# Patient Record
Sex: FEMALE | Race: WHITE | NOT HISPANIC OR LATINO | Employment: OTHER | URBAN - METROPOLITAN AREA
[De-identification: names, ages, dates, MRNs, and addresses within clinical notes are randomized per-mention and may not be internally consistent; named-entity substitution may affect disease eponyms.]

---

## 2023-05-16 ENCOUNTER — OFFICE VISIT (OUTPATIENT)
Dept: FAMILY MEDICINE CLINIC | Facility: CLINIC | Age: 43
End: 2023-05-16
Payer: MEDICARE

## 2023-05-16 VITALS
OXYGEN SATURATION: 98 % | DIASTOLIC BLOOD PRESSURE: 82 MMHG | HEART RATE: 105 BPM | SYSTOLIC BLOOD PRESSURE: 126 MMHG | RESPIRATION RATE: 18 BRPM | HEIGHT: 63 IN | TEMPERATURE: 98.7 F | WEIGHT: 217 LBS | BODY MASS INDEX: 38.45 KG/M2

## 2023-05-16 DIAGNOSIS — K21.9 GASTROESOPHAGEAL REFLUX DISEASE WITHOUT ESOPHAGITIS: ICD-10-CM

## 2023-05-16 DIAGNOSIS — R53.82 CHRONIC FATIGUE, UNSPECIFIED: ICD-10-CM

## 2023-05-16 DIAGNOSIS — Z13.220 ENCOUNTER FOR LIPID SCREENING FOR CARDIOVASCULAR DISEASE: ICD-10-CM

## 2023-05-16 DIAGNOSIS — E55.9 VITAMIN D DEFICIENCY: ICD-10-CM

## 2023-05-16 DIAGNOSIS — E53.8 VITAMIN B12 DEFICIENCY: ICD-10-CM

## 2023-05-16 DIAGNOSIS — E66.9 CLASS 2 OBESITY WITHOUT SERIOUS COMORBIDITY WITH BODY MASS INDEX (BMI) OF 38.0 TO 38.9 IN ADULT, UNSPECIFIED OBESITY TYPE: ICD-10-CM

## 2023-05-16 DIAGNOSIS — Z13.6 ENCOUNTER FOR LIPID SCREENING FOR CARDIOVASCULAR DISEASE: ICD-10-CM

## 2023-05-16 DIAGNOSIS — Z00.00 WELLNESS EXAMINATION: Primary | ICD-10-CM

## 2023-05-16 PROBLEM — G43.709 CHRONIC MIGRAINE WITHOUT AURA WITHOUT STATUS MIGRAINOSUS, NOT INTRACTABLE: Status: ACTIVE | Noted: 2022-12-05

## 2023-05-16 PROBLEM — G93.2 PSEUDOTUMOR CEREBRI SYNDROME: Status: ACTIVE | Noted: 2023-05-16

## 2023-05-16 RX ORDER — TOPIRAMATE 200 MG/1
200 CAPSULE, EXTENDED RELEASE ORAL DAILY
COMMUNITY
Start: 2023-03-17

## 2023-05-16 RX ORDER — ACETAZOLAMIDE 250 MG/1
TABLET ORAL
COMMUNITY

## 2023-05-16 RX ORDER — LANOLIN ALCOHOL/MO/W.PET/CERES
CREAM (GRAM) TOPICAL
COMMUNITY
Start: 2023-04-26

## 2023-05-16 RX ORDER — PANTOPRAZOLE SODIUM 40 MG/1
40 TABLET, DELAYED RELEASE ORAL DAILY
COMMUNITY
End: 2023-05-16 | Stop reason: SDUPTHER

## 2023-05-16 RX ORDER — PANTOPRAZOLE SODIUM 40 MG/1
40 TABLET, DELAYED RELEASE ORAL DAILY
Qty: 90 TABLET | Refills: 0 | Status: SHIPPED | OUTPATIENT
Start: 2023-05-16

## 2023-05-16 RX ORDER — BUTALBITAL, ACETAMINOPHEN AND CAFFEINE 300; 40; 50 MG/1; MG/1; MG/1
CAPSULE ORAL
COMMUNITY
Start: 2022-10-27

## 2023-05-16 RX ORDER — ATORVASTATIN CALCIUM 40 MG/1
TABLET, FILM COATED ORAL
COMMUNITY
Start: 2022-10-27

## 2023-05-16 NOTE — PATIENT INSTRUCTIONS
Continue care with neurologist   Pantoprazole sent to pharmacy  Labs ordered - return to clinic 8-4:30 M-F (not 1P-2P during lunch). Will call with results.

## 2023-05-16 NOTE — PROGRESS NOTES
Chief Complaint   Patient presents with   • Establish Care     Was recommended  needs new pcp.    Medication review.       Subjective      Yu Potts is a 42 y.o. who presents to establish care and medication review. Lives with her daughter in [place], [place].     Migraines - States that she follows up with neurology for pseudotumor cerebri  / migraines.  Has a shunt that was placed in 2021 that drains into abdomen.  Follows up every 6 weeks. Permanently disabled for migraines.  Has botox injections regularly and takes medications as prescribed.     GERD - never diagnosed with GERD, but takes protonix daily.  Has tried to stop medication for a few days in the past and her symptoms usually recur (indigestion, heartburn).  Randomly has difficulty with certain foods like pizza.  Protonix seems to be working well.       Chronic fatigue - states no increase, but she is always tired.  She does not sleep well. Has trouble falling asleep.  Has tried medication like tylenol PM, but she is more tired the following day.  Snores sometimes, but no choking or gasping for air in the middle of the night.  Vitamin b-12 deficiency - takes oral vitamin b-12.  Has been on injections in the past before. Has never helped with fatigue.  Takes daily vitamin D as well and that does not help with fatigue.     Exercise - walks around the pool at apartment and parking lots.  Will be swimming this summer. Cannot over exert due to shunt into abdominal cavity - no strenuous exercise.     The following portions of the patient's history were reviewed and updated as appropriate: allergies, current medications, past family history, past medical history, past social history, past surgical history and problem list.    Review of Systems   Constitutional: Positive for fatigue (chronic).   Eyes:        No peripheral vision due to shunt, sees ophthalmologist annually. No change since shunt   Respiratory: Negative for chest tightness and shortness of  "breath.    Cardiovascular: Negative for chest pain and palpitations.   Gastrointestinal: Positive for nausea (only with migraiens) and GERD. Negative for blood in stool, constipation, diarrhea and vomiting.   Genitourinary: Negative for dysuria, menstrual problem (hysterectomy), pelvic pain, urgency, vaginal bleeding and vaginal discharge.   Neurological: Positive for dizziness (intermittently, has good and bad days with shunt), weakness (right side), numbness (right side r/t shunt) and headache. Negative for syncope and speech difficulty.   Psychiatric/Behavioral: Positive for sleep disturbance (2-3 hours at night). Negative for self-injury, suicidal ideas and depressed mood. The patient is nervous/anxious.        Objective   Vital Signs:  /82   Pulse 105   Temp 98.7 °F (37.1 °C)   Resp 18   Ht 160 cm (63\")   Wt 98.4 kg (217 lb)   SpO2 98%   BMI 38.44 kg/m²             Physical Exam  Vitals and nursing note reviewed.   Constitutional:       Appearance: Normal appearance.   HENT:      Right Ear: Tympanic membrane, ear canal and external ear normal.      Left Ear: Tympanic membrane, ear canal and external ear normal.      Nose: Nose normal.      Mouth/Throat:      Mouth: Mucous membranes are moist.      Pharynx: Oropharynx is clear.   Eyes:      Extraocular Movements: Extraocular movements intact.      Pupils: Pupils are equal, round, and reactive to light.   Cardiovascular:      Rate and Rhythm: Regular rhythm.      Pulses: Normal pulses.      Heart sounds: Normal heart sounds.   Pulmonary:      Breath sounds: Normal breath sounds.   Abdominal:      General: Bowel sounds are normal.      Palpations: Abdomen is soft.   Musculoskeletal:         General: Normal range of motion.      Right lower leg: No edema.      Left lower leg: No edema.   Skin:     General: Skin is warm and dry.   Neurological:      General: No focal deficit present.      Mental Status: She is alert.      Cranial Nerves: No cranial " nerve deficit or facial asymmetry.      Motor: No weakness.      Coordination: Coordination normal.      Gait: Gait normal.      Deep Tendon Reflexes:      Reflex Scores:       Patellar reflexes are 2+ on the right side and 2+ on the left side.         Result Review                     Assessment and Plan  Diagnoses and all orders for this visit:    1. Wellness examination (Primary)  -     Hepatitis C Antibody; Future  -     Lipid Panel; Future    2. Gastroesophageal reflux disease without esophagitis  -     pantoprazole (PROTONIX) 40 MG EC tablet; Take 1 tablet by mouth Daily.  Dispense: 90 tablet; Refill: 0    3. Vitamin B12 deficiency  -     Vitamin B12; Future    4. Vitamin D deficiency  -     Vitamin D,25-Hydroxy; Future    5. Class 2 obesity without serious comorbidity with body mass index (BMI) of 38.0 to 38.9 in adult, unspecified obesity type  -     TSH Rfx On Abnormal To Free T4; Future  -     Comprehensive Metabolic Panel; Future  -     CBC & Differential; Future    6. Encounter for lipid screening for cardiovascular disease  -     Lipid Panel; Future    7. Chronic fatigue, unspecified  -     TSH Rfx On Abnormal To Free T4; Future      Discussed possibility that interruption in sleep is contributing to fatigue.  Labs to r/o any other contributing factors.  Will address follow up with lab results            Patient Instructions   1. Continue care with neurologist   2. Pantoprazole sent to pharmacy  3. Labs ordered - return to clinic 8-4:30 M-F (not 1P-2P during lunch). Will call with results.       Discussed medications prescribed and OTC medications recommended.  Discussed medication safety, possible side effects and how to take or administer medications. Instructed patient to report any adverse reactions, side effects or concerns.     Reviewed physical exam findings and plan with patient who verbalized understanding and agrees with plan of care. Patient was given opportunity to ask questions and all  concerns were addressed prior to the conclusion of today's visit.     Follow Up  No follow-ups on file.  Patient was given instructions and counseling regarding her condition or for health maintenance advice. Please see specific information pulled into the AVS if appropriate.

## 2023-05-24 ENCOUNTER — PATIENT ROUNDING (BHMG ONLY) (OUTPATIENT)
Dept: FAMILY MEDICINE CLINIC | Facility: CLINIC | Age: 43
End: 2023-05-24
Payer: MEDICARE

## 2023-05-24 NOTE — PROGRESS NOTES
A My-Chart message has been sent to the patient for PATIENT ROUNDING with [facility].

## 2023-08-11 DIAGNOSIS — K21.9 GASTROESOPHAGEAL REFLUX DISEASE WITHOUT ESOPHAGITIS: ICD-10-CM

## 2023-08-11 RX ORDER — PANTOPRAZOLE SODIUM 40 MG/1
40 TABLET, DELAYED RELEASE ORAL DAILY
Qty: 90 TABLET | Refills: 0 | Status: SHIPPED | OUTPATIENT
Start: 2023-08-11

## 2023-09-25 DIAGNOSIS — K21.9 GASTROESOPHAGEAL REFLUX DISEASE WITHOUT ESOPHAGITIS: ICD-10-CM

## 2023-09-25 RX ORDER — LANOLIN ALCOHOL/MO/W.PET/CERES
1000 CREAM (GRAM) TOPICAL DAILY
Qty: 30 TABLET | Refills: 2 | Status: SHIPPED | OUTPATIENT
Start: 2023-09-25

## 2023-09-25 RX ORDER — ATORVASTATIN CALCIUM 40 MG/1
40 TABLET, FILM COATED ORAL NIGHTLY
Qty: 90 TABLET | Refills: 0 | Status: SHIPPED | OUTPATIENT
Start: 2023-09-25

## 2023-09-25 RX ORDER — PANTOPRAZOLE SODIUM 40 MG/1
40 TABLET, DELAYED RELEASE ORAL DAILY
Qty: 90 TABLET | Refills: 0 | Status: SHIPPED | OUTPATIENT
Start: 2023-09-25

## 2023-09-25 RX ORDER — TOPIRAMATE 200 MG/1
200 CAPSULE, EXTENDED RELEASE ORAL DAILY
Qty: 30 EACH | OUTPATIENT
Start: 2023-09-25

## 2023-09-25 NOTE — TELEPHONE ENCOUNTER
Patient recently switched to [facility] and Jessica has yet to fill this, wanted to see if we can just re call everything into [facility]? Also patient is neeting ViT D 5,000 IU called in she said she takes this daily

## 2023-10-18 DIAGNOSIS — E53.8 VITAMIN B12 DEFICIENCY: ICD-10-CM

## 2023-10-18 DIAGNOSIS — E55.9 VITAMIN D DEFICIENCY: Primary | ICD-10-CM

## 2023-10-18 RX ORDER — LANOLIN ALCOHOL/MO/W.PET/CERES
1000 CREAM (GRAM) TOPICAL DAILY
Qty: 90 TABLET | Refills: 1 | Status: SHIPPED | OUTPATIENT
Start: 2023-10-18

## 2023-11-24 DIAGNOSIS — K21.9 GASTROESOPHAGEAL REFLUX DISEASE WITHOUT ESOPHAGITIS: ICD-10-CM

## 2023-11-27 RX ORDER — PANTOPRAZOLE SODIUM 40 MG/1
40 TABLET, DELAYED RELEASE ORAL DAILY
Qty: 90 TABLET | Refills: 0 | Status: SHIPPED | OUTPATIENT
Start: 2023-11-27

## 2023-11-30 DIAGNOSIS — K21.9 GASTROESOPHAGEAL REFLUX DISEASE WITHOUT ESOPHAGITIS: ICD-10-CM

## 2023-11-30 RX ORDER — ATORVASTATIN CALCIUM 40 MG/1
40 TABLET, FILM COATED ORAL NIGHTLY
Qty: 90 TABLET | Refills: 0 | Status: SHIPPED | OUTPATIENT
Start: 2023-11-30

## 2023-11-30 RX ORDER — PANTOPRAZOLE SODIUM 40 MG/1
40 TABLET, DELAYED RELEASE ORAL DAILY
Qty: 90 TABLET | Refills: 0 | OUTPATIENT
Start: 2023-11-30

## 2023-12-04 ENCOUNTER — TELEPHONE (OUTPATIENT)
Dept: FAMILY MEDICINE CLINIC | Facility: CLINIC | Age: 43
End: 2023-12-04
Payer: MEDICARE

## 2023-12-04 DIAGNOSIS — K21.9 GASTROESOPHAGEAL REFLUX DISEASE WITHOUT ESOPHAGITIS: ICD-10-CM

## 2023-12-04 NOTE — TELEPHONE ENCOUNTER
Caller: Yu Potts    Relationship: Self    Best call back number: [phone]    Which medication are you concerned about: ATORVASTATIN    Who prescribed you this medication: GABBY    What are your concerns: PATIENT STATED THAT HER MEDICATION WAS SUPPOSED TO GO TO [facility]

## 2023-12-05 RX ORDER — ATORVASTATIN CALCIUM 40 MG/1
40 TABLET, FILM COATED ORAL NIGHTLY
Qty: 90 TABLET | Refills: 0 | Status: SHIPPED | OUTPATIENT
Start: 2023-12-05

## 2023-12-05 RX ORDER — PANTOPRAZOLE SODIUM 40 MG/1
40 TABLET, DELAYED RELEASE ORAL DAILY
Qty: 90 TABLET | Refills: 0 | Status: SHIPPED | OUTPATIENT
Start: 2023-12-05

## 2023-12-20 ENCOUNTER — OFFICE VISIT (OUTPATIENT)
Dept: FAMILY MEDICINE CLINIC | Facility: CLINIC | Age: 43
End: 2023-12-20
Payer: MEDICARE

## 2023-12-20 ENCOUNTER — PATIENT MESSAGE (OUTPATIENT)
Dept: FAMILY MEDICINE CLINIC | Facility: CLINIC | Age: 43
End: 2023-12-20

## 2023-12-20 VITALS
DIASTOLIC BLOOD PRESSURE: 88 MMHG | SYSTOLIC BLOOD PRESSURE: 128 MMHG | HEART RATE: 105 BPM | WEIGHT: 213 LBS | BODY MASS INDEX: 37.74 KG/M2 | HEIGHT: 63 IN | TEMPERATURE: 98.4 F | OXYGEN SATURATION: 98 % | RESPIRATION RATE: 20 BRPM

## 2023-12-20 DIAGNOSIS — G47.00 INSOMNIA, UNSPECIFIED TYPE: Primary | ICD-10-CM

## 2023-12-20 DIAGNOSIS — R53.82 CHRONIC FATIGUE, UNSPECIFIED: ICD-10-CM

## 2023-12-20 DIAGNOSIS — G93.2 PSEUDOTUMOR CEREBRI SYNDROME: ICD-10-CM

## 2023-12-20 PROCEDURE — 99214 OFFICE O/P EST MOD 30 MIN: CPT

## 2023-12-20 RX ORDER — ONDANSETRON 4 MG/1
4 TABLET, FILM COATED ORAL
COMMUNITY
Start: 2023-11-02

## 2023-12-20 NOTE — PROGRESS NOTES
"Chief Complaint   Patient presents with    Insomnia     X 1 year not getting better  Body mass index is 37.73 kg/m².       Subjective      Yu Potts is a 43 y.o. who presents for insomnia - sleeps for a couple of hours and then wakes up and cannot fall back asleep. Tries to avoid using her phone. Lays with eyes closed but cannot fall back to asleep.  Then she does fall asleep for a few hours.  Naps a couple of hours in the early morning hours and then feels exhausted all day long.    The following portions of the patient's history were reviewed and updated as appropriate: allergies, current medications, past family history, past medical history, past social history, past surgical history, and problem list.    Review of Systems   Constitutional:  Negative for chills and fever.   Respiratory:  Negative for chest tightness.    Psychiatric/Behavioral:  Positive for sleep disturbance (see HPI). Negative for suicidal ideas, depressed mood and stress. The patient is not nervous/anxious.        Objective   Vital Signs:  /88   Pulse 105   Temp 98.4 °F (36.9 °C)   Resp 20   Ht 160 cm (63\")   Wt 96.6 kg (213 lb)   SpO2 98%   BMI 37.73 kg/m²             Physical Exam  Constitutional:       Appearance: Normal appearance.   Cardiovascular:      Rate and Rhythm: Normal rate and regular rhythm.      Heart sounds: Normal heart sounds.   Neurological:      General: No focal deficit present.      Mental Status: She is alert and oriented to person, place, and time.   Psychiatric:         Mood and Affect: Mood normal.         Behavior: Behavior normal.          Result Review                     Assessment and Plan  Diagnoses and all orders for this visit:    1. Insomnia, unspecified type (Primary)    2. Chronic fatigue, unspecified    3. Pseudotumor cerebri syndrome      Start melatonin OTC to see if this helps with sleep.   Ask neurology if they are okay with starting trazodone or seroquel if melatonin does not work " (history of pseudotumor cerebri)  Send wunderloop message to provider with response from neurology after trial of melatonin.           Discussed medications prescribed and OTC medications recommended.  Discussed medication safety, possible side effects and how to take or administer medications. Instructed patient to report any adverse reactions, side effects or concerns.     Reviewed physical exam findings and plan with patient who verbalized understanding and agrees with plan of care. Patient was given opportunity to ask questions and all concerns were addressed prior to the conclusion of today's visit.     Follow Up  No follow-ups on file.  Patient was given instructions and counseling regarding her condition or for health maintenance advice. Please see specific information pulled into the AVS if appropriate.     Note to patient: The 21st Century Cures Act makes medical notes like these available to patients in the interest of transparency. However, be advised this is a medical document. It is intended as peer to peer communication. It is written in medical language and may contain abbreviations or verbiage that are unfamiliar. It may appear blunt or direct. Medical documents are intended to carry relevant information, facts as evident, and the clinical opinion of the provider.

## 2023-12-26 RX ORDER — TRAZODONE HYDROCHLORIDE 50 MG/1
50 TABLET ORAL NIGHTLY
Qty: 30 TABLET | Refills: 0 | Status: SHIPPED | OUTPATIENT
Start: 2023-12-26

## 2023-12-26 NOTE — TELEPHONE ENCOUNTER
From: Yu Potts  To: Kristen Grigsbyy  Sent: 12/20/2023 3:49 PM EST  Subject: Neurologist    Megan Collett(neurologist) didn't have an issue with starting trazodone. Would prefer to hold off on seroquel if possible. Thank you!

## 2024-01-14 ENCOUNTER — PATIENT MESSAGE (OUTPATIENT)
Dept: FAMILY MEDICINE CLINIC | Facility: CLINIC | Age: 44
End: 2024-01-14
Payer: MEDICARE

## 2024-01-14 DIAGNOSIS — G47.00 INSOMNIA, UNSPECIFIED TYPE: ICD-10-CM

## 2024-01-15 NOTE — TELEPHONE ENCOUNTER
From: Yu Potts  To: Kristen Norris  Sent: 1/14/2024 10:31 PM EST  Subject: Change Dose    Can we try a different dose of the trazodone? I feel like it was working amazingly the first couple of days, and now it's almost like a plateau or regression is starting. I think that's the best way to describe it. Taking forever to fall asleep, getting more sleep than before, but then waking myself up moving, etc, and forever to go back to sleep.  I think we have almost found the happy medium. If I need to make an appointment, I can. I will have 12 left after I take tonight's. Thank you!

## 2024-01-16 RX ORDER — TRAZODONE HYDROCHLORIDE 100 MG/1
100 TABLET ORAL NIGHTLY
Qty: 30 TABLET | Refills: 0 | Status: SHIPPED | OUTPATIENT
Start: 2024-01-16

## 2024-02-13 DIAGNOSIS — K21.9 GASTROESOPHAGEAL REFLUX DISEASE WITHOUT ESOPHAGITIS: ICD-10-CM

## 2024-02-13 DIAGNOSIS — E53.8 VITAMIN B12 DEFICIENCY: ICD-10-CM

## 2024-02-13 DIAGNOSIS — E55.9 VITAMIN D DEFICIENCY: ICD-10-CM

## 2024-02-13 RX ORDER — ATORVASTATIN CALCIUM 40 MG/1
40 TABLET, FILM COATED ORAL
Qty: 90 TABLET | Refills: 0 | Status: SHIPPED | OUTPATIENT
Start: 2024-02-13

## 2024-02-13 RX ORDER — PANTOPRAZOLE SODIUM 40 MG/1
40 TABLET, DELAYED RELEASE ORAL DAILY
Qty: 90 TABLET | Refills: 0 | Status: SHIPPED | OUTPATIENT
Start: 2024-02-13

## 2024-02-14 ENCOUNTER — OFFICE VISIT (OUTPATIENT)
Dept: FAMILY MEDICINE CLINIC | Facility: CLINIC | Age: 44
End: 2024-02-14
Payer: MEDICARE

## 2024-02-14 VITALS
WEIGHT: 209 LBS | SYSTOLIC BLOOD PRESSURE: 128 MMHG | TEMPERATURE: 98.6 F | DIASTOLIC BLOOD PRESSURE: 88 MMHG | OXYGEN SATURATION: 97 % | BODY MASS INDEX: 37.03 KG/M2 | RESPIRATION RATE: 16 BRPM | HEART RATE: 96 BPM | HEIGHT: 63 IN

## 2024-02-14 DIAGNOSIS — M72.2 PLANTAR FASCIITIS OF LEFT FOOT: Primary | ICD-10-CM

## 2024-02-14 PROCEDURE — 99213 OFFICE O/P EST LOW 20 MIN: CPT

## 2024-02-14 PROCEDURE — 1159F MED LIST DOCD IN RCRD: CPT

## 2024-02-14 PROCEDURE — 1160F RVW MEDS BY RX/DR IN RCRD: CPT

## 2024-02-14 RX ORDER — LANOLIN ALCOHOL/MO/W.PET/CERES
1000 CREAM (GRAM) TOPICAL DAILY
Qty: 90 TABLET | Refills: 0 | Status: SHIPPED | OUTPATIENT
Start: 2024-02-14

## 2024-02-14 RX ORDER — CHOLECALCIFEROL (VITAMIN D3) 125 MCG
1 CAPSULE ORAL DAILY
Qty: 90 TABLET | Refills: 0 | Status: SHIPPED | OUTPATIENT
Start: 2024-02-14

## 2024-02-15 DIAGNOSIS — G47.00 INSOMNIA, UNSPECIFIED TYPE: ICD-10-CM

## 2024-02-15 RX ORDER — TRAZODONE HYDROCHLORIDE 100 MG/1
100 TABLET ORAL
Qty: 30 TABLET | Refills: 3 | Status: SHIPPED | OUTPATIENT
Start: 2024-02-15

## 2024-03-20 DIAGNOSIS — E55.9 VITAMIN D DEFICIENCY: ICD-10-CM

## 2024-03-20 DIAGNOSIS — K21.9 GASTROESOPHAGEAL REFLUX DISEASE WITHOUT ESOPHAGITIS: ICD-10-CM

## 2024-03-20 NOTE — TELEPHONE ENCOUNTER
Called pt, checking on her medication? Had a request for refills. She should have two months worth left. Should not need a refill until 5/15.

## 2024-03-21 RX ORDER — PANTOPRAZOLE SODIUM 40 MG/1
40 TABLET, DELAYED RELEASE ORAL DAILY
Qty: 90 TABLET | Refills: 0 | OUTPATIENT
Start: 2024-03-21

## 2024-03-21 RX ORDER — ATORVASTATIN CALCIUM 40 MG/1
40 TABLET, FILM COATED ORAL
Qty: 90 TABLET | Refills: 0 | OUTPATIENT
Start: 2024-03-21

## 2024-03-21 RX ORDER — CHOLECALCIFEROL (VITAMIN D3) 125 MCG
1 CAPSULE ORAL DAILY
Qty: 90 TABLET | Refills: 0 | OUTPATIENT
Start: 2024-03-21

## 2024-04-15 ENCOUNTER — TELEPHONE (OUTPATIENT)
Dept: FAMILY MEDICINE CLINIC | Facility: CLINIC | Age: 44
End: 2024-04-15
Payer: MEDICARE

## 2024-04-15 ENCOUNTER — OFFICE VISIT (OUTPATIENT)
Dept: FAMILY MEDICINE CLINIC | Facility: CLINIC | Age: 44
End: 2024-04-15
Payer: MEDICARE

## 2024-04-15 VITALS
HEART RATE: 98 BPM | TEMPERATURE: 97.7 F | DIASTOLIC BLOOD PRESSURE: 74 MMHG | BODY MASS INDEX: 37.42 KG/M2 | OXYGEN SATURATION: 99 % | RESPIRATION RATE: 14 BRPM | HEIGHT: 63 IN | SYSTOLIC BLOOD PRESSURE: 120 MMHG | WEIGHT: 211.2 LBS

## 2024-04-15 DIAGNOSIS — J01.00 ACUTE NON-RECURRENT MAXILLARY SINUSITIS: Primary | ICD-10-CM

## 2024-04-15 PROCEDURE — 1160F RVW MEDS BY RX/DR IN RCRD: CPT | Performed by: NURSE PRACTITIONER

## 2024-04-15 PROCEDURE — 1159F MED LIST DOCD IN RCRD: CPT | Performed by: NURSE PRACTITIONER

## 2024-04-15 PROCEDURE — 99213 OFFICE O/P EST LOW 20 MIN: CPT | Performed by: NURSE PRACTITIONER

## 2024-04-15 RX ORDER — AMOXICILLIN AND CLAVULANATE POTASSIUM 875; 125 MG/1; MG/1
1 TABLET, FILM COATED ORAL 2 TIMES DAILY
Qty: 20 TABLET | Refills: 0 | Status: SHIPPED | OUTPATIENT
Start: 2024-04-15 | End: 2024-04-25

## 2024-04-15 NOTE — PROGRESS NOTES
Date: 04/15/2024   Patient Name: Yu Potts  : 1980   MRN: [id number]     Chief Complaint:    Chief Complaint   Patient presents with    Sinusitis     Had dental surgery on Wednesday and it started Friday after that.        History of Present Illness: Yu Potts is a 43 y.o. female who is here today for Right sinus pressure and congestion, PND, cough for the last 4 days.   Recently had dental work on right side of mouth  Amoxicillin and ibuprofen without relief of symptoms.   No other associated symptoms.    Sinusitis  Associated symptoms include congestion, coughing and sinus pressure.            Review of Systems:   Review of Systems   HENT:  Positive for congestion, postnasal drip and sinus pressure.    Respiratory:  Positive for cough.        Past Medical History:   Past Medical History:   Diagnosis Date    Headache 2017    Complex Migraines    Hyperlipidemia 2018    Hypertension 2017    Off and on since     Stroke 2017    Possibly caused by migraines or pseudotumor    Visual impairment 2017    Loss of vision due to pseudotumor       Past Surgical History:   Past Surgical History:   Procedure Laterality Date    APPENDECTOMY      BRAIN SURGERY      Right  brain shunt    CHOLECYSTECTOMY      HYSTERECTOMY  2016    SINUS SURGERY      TUBAL ABDOMINAL LIGATION         Family History:   Family History   Problem Relation Age of Onset    Stroke Mother     Cancer Father         Pancreatic       Social History:   Social History     Socioeconomic History    Marital status: Significant Other   Tobacco Use    Smoking status: Never    Smokeless tobacco: Never   Vaping Use    Vaping status: Never Used   Substance and Sexual Activity    Alcohol use: Never    Drug use: Never    Sexual activity: Yes     Partners: Female, Male       Medications:     Current Outpatient Medications:     atorvastatin (LIPITOR) 40 MG tablet, TAKE ONE TABLET BY MOUTH AT BEDTIME, Disp: 90 tablet, Rfl: 0     "butalbital-acetaminophen-caffeine (ORBIVAN) -40 MG capsule capsule, Take 1 capsule PO up to BID PRN for migraine., Disp: , Rfl:     Cholecalciferol (Vitamin D3) 50 MCG (2000 UT) tablet, TAKE ONE TABLET BY MOUTH EVERY DAY, Disp: 90 tablet, Rfl: 0    ondansetron (ZOFRAN) 4 MG tablet, Take 1 tablet by mouth., Disp: , Rfl:     pantoprazole (PROTONIX) 40 MG EC tablet, TAKE ONE TABLET BY MOUTH EVERY DAY, Disp: 90 tablet, Rfl: 0    Topiramate  MG capsule extended-release 24 hour sprinkle, Take 200 mg by mouth Daily., Disp: , Rfl:     traZODone (DESYREL) 100 MG tablet, TAKE ONE TABLET BY MOUTH EVERY NIGHT, Disp: 30 tablet, Rfl: 3    ubrogepant (Ubrelvy) 100 MG tablet, Ubrelvy 100 mg tablet, Disp: , Rfl:     vitamin B-12 (CYANOCOBALAMIN) 1000 MCG tablet, TAKE ONE TABLET BY MOUTH EVERY DAY, Disp: 90 tablet, Rfl: 0    amoxicillin-clavulanate (AUGMENTIN) 875-125 MG per tablet, Take 1 tablet by mouth 2 (Two) Times a Day for 10 days., Disp: 20 tablet, Rfl: 0    Allergies:   Allergies   Allergen Reactions    Zithromax [Azithromycin] Swelling    Citrus Swelling    Elissa Swelling    Latex Itching         Physical Exam:  Vital Signs:   Vitals:    04/15/24 1231   BP: 120/74   Pulse: 98   Resp: 14   Temp: 97.7 °F (36.5 °C)   SpO2: 99%   Weight: 95.8 kg (211 lb 3.2 oz)   Height: 160 cm (63\")     Body mass index is 37.41 kg/m².     Physical Exam  Vitals and nursing note reviewed.   Constitutional:       Appearance: She is not ill-appearing.   HENT:      Head: Normocephalic and atraumatic.      Right Ear: External ear normal. No middle ear effusion. Tympanic membrane is not erythematous or bulging.      Left Ear: External ear normal.  No middle ear effusion. Tympanic membrane is not erythematous or bulging.      Nose: Nose normal. No nasal tenderness or congestion.      Right Turbinates: Swollen. Not enlarged.      Left Turbinates: Not enlarged or swollen.      Right Sinus: Maxillary sinus tenderness present.      Left Sinus: " No maxillary sinus tenderness.      Mouth/Throat:      Mouth: Mucous membranes are moist.      Pharynx: No pharyngeal swelling or posterior oropharyngeal erythema.      Tonsils: No tonsillar exudate.   Eyes:      Pupils: Pupils are equal, round, and reactive to light.   Cardiovascular:      Rate and Rhythm: Normal rate and regular rhythm.   Pulmonary:      Effort: Pulmonary effort is normal.      Breath sounds: Normal breath sounds.   Abdominal:      General: Bowel sounds are normal.   Musculoskeletal:      Cervical back: Normal range of motion and neck supple.   Skin:     General: Skin is warm.   Neurological:      General: No focal deficit present.      Mental Status: She is alert and oriented to person, place, and time.   Psychiatric:         Mood and Affect: Mood normal.           Assessment/Plan:   Diagnoses and all orders for this visit:    1. Acute non-recurrent maxillary sinusitis (Primary)  -     amoxicillin-clavulanate (AUGMENTIN) 875-125 MG per tablet; Take 1 tablet by mouth 2 (Two) Times a Day for 10 days.  Dispense: 20 tablet; Refill: 0       Changing amoxicillin to augmentin  Increase fluid intake  Take full course of medication.  Monitor for worsening symptoms  Tylenol and ibuprofen as needed for aches and fever.  Go to the ER for any shortness of breath, chest pains, fever uncontrolled by medications.      Follow Up:   Return if symptoms worsen or fail to improve.    Gale Gutierrez. SANDOVAL SHELBY [facility]

## 2024-05-04 DIAGNOSIS — E53.8 VITAMIN B12 DEFICIENCY: ICD-10-CM

## 2024-05-04 DIAGNOSIS — E55.9 VITAMIN D DEFICIENCY: ICD-10-CM

## 2024-05-06 RX ORDER — LANOLIN ALCOHOL/MO/W.PET/CERES
1000 CREAM (GRAM) TOPICAL DAILY
Qty: 90 TABLET | Refills: 0 | Status: SHIPPED | OUTPATIENT
Start: 2024-05-06

## 2024-05-06 RX ORDER — CHOLECALCIFEROL (VITAMIN D3) 125 MCG
1 CAPSULE ORAL DAILY
Qty: 90 TABLET | Refills: 0 | Status: SHIPPED | OUTPATIENT
Start: 2024-05-06

## 2024-12-11 ENCOUNTER — OFFICE VISIT (OUTPATIENT)
Dept: FAMILY MEDICINE CLINIC | Facility: CLINIC | Age: 44
End: 2024-12-11
Payer: MEDICARE

## 2024-12-11 VITALS
SYSTOLIC BLOOD PRESSURE: 124 MMHG | RESPIRATION RATE: 14 BRPM | BODY MASS INDEX: 35.97 KG/M2 | TEMPERATURE: 98.2 F | DIASTOLIC BLOOD PRESSURE: 82 MMHG | OXYGEN SATURATION: 99 % | HEIGHT: 63 IN | WEIGHT: 203 LBS | HEART RATE: 92 BPM

## 2024-12-11 DIAGNOSIS — G47.00 INSOMNIA, UNSPECIFIED TYPE: ICD-10-CM

## 2024-12-11 DIAGNOSIS — E78.00 HYPERCHOLESTEROLEMIA: ICD-10-CM

## 2024-12-11 DIAGNOSIS — K21.9 GASTROESOPHAGEAL REFLUX DISEASE WITHOUT ESOPHAGITIS: ICD-10-CM

## 2024-12-11 DIAGNOSIS — R52 BODY ACHES: Primary | ICD-10-CM

## 2024-12-11 LAB
EXPIRATION DATE: NORMAL
FLUAV AG UPPER RESP QL IA.RAPID: NOT DETECTED
FLUBV AG UPPER RESP QL IA.RAPID: NOT DETECTED
INTERNAL CONTROL: NORMAL
Lab: NORMAL
SARS-COV-2 AG UPPER RESP QL IA.RAPID: NOT DETECTED

## 2024-12-11 PROCEDURE — 99214 OFFICE O/P EST MOD 30 MIN: CPT | Performed by: PHYSICIAN ASSISTANT

## 2024-12-11 PROCEDURE — 87428 SARSCOV & INF VIR A&B AG IA: CPT | Performed by: PHYSICIAN ASSISTANT

## 2024-12-11 RX ORDER — ATOGEPANT 60 MG/1
TABLET ORAL
COMMUNITY
Start: 2024-07-11

## 2024-12-11 RX ORDER — ATORVASTATIN CALCIUM 40 MG/1
40 TABLET, FILM COATED ORAL
Qty: 90 TABLET | Refills: 3 | Status: SHIPPED | OUTPATIENT
Start: 2024-12-11

## 2024-12-11 RX ORDER — TRAZODONE HYDROCHLORIDE 100 MG/1
100 TABLET ORAL
Qty: 90 TABLET | Refills: 3 | Status: SHIPPED | OUTPATIENT
Start: 2024-12-11

## 2024-12-11 RX ORDER — PANTOPRAZOLE SODIUM 40 MG/1
40 TABLET, DELAYED RELEASE ORAL DAILY
Qty: 90 TABLET | Refills: 3 | Status: SHIPPED | OUTPATIENT
Start: 2024-12-11

## 2024-12-11 NOTE — PROGRESS NOTES
Subjective   Yu Potts is a 44 y.o. female.     History of Present Illness   History of Present Illness  The patient presents for evaluation of viral illness, hypercholesterolemia, acid reflux, and insomnia.    She began experiencing symptoms on Monday, which have remained consistent. Initially, she experienced diarrhea, which ceased yesterday afternoon. She has been consuming crackers and soup. She reports no blood in her stool. She reports no sinus issues or respiratory concerns but experiences severe fevers at night. Her baseline temperature is typically 96 degrees, but it has recently increased to 99 degrees. She reports no urinary symptoms such as frequency, urgency, or burning, but notes a decrease in urine output due to reduced fluid intake. She reports no ear pain or throat discomfort but mentions dry mouth. She has a constant headache and a brain shunt due to pseudotumor cerebri. She has been maintaining hydration and reports no vomiting. She has sufficient antiemetic medication prescribed by her neurologist. She has been adhering to Medicare wellness visits since her disability status was granted. She reports no history of urinary tract infections. She reports no localized abdominal pain but mentions cramping. She has had a cholecystectomy and appendectomy.    She has a brain shunt due to a pseudotumor cerebri and is under the care of a neurologist. She has a history of mini strokes and right-sided weakness. She has been on permanent disability due to the pseudotumor cerebri. She has been following up with neurology. She has been taking nausea medication prescribed by neurology.    She has been taking atorvastatin for cholesterol management.    She has been taking pantoprazole 40 mg for acid reflux.    She has been taking trazodone 100 mg and has been stable on this dose.    Supplemental Information  She has been seeing Bouchra López in hematology every 6 months for blood work because her white blood  "cell count has always been extremely high. She saw her last week and had a lot of blood work done. She has been doing genetic testing as well. She has been seeing her for 3 years now since she started going to her when she had the brain shunt. Her potassium was really low this time. She had a complete hysterectomy with ovaries removed due to a benign cyst.    SOCIAL HISTORY  She has been on permanent disability.    FAMILY HISTORY  She reports no family history of bleeding disorders or leukocytosis.    MEDICATIONS  atorvastatin, pantoprazole, trazodone       The following portions of the patient's history were reviewed and updated as appropriate: allergies, current medications, past family history, past medical history, past social history, past surgical history, and problem list.    Review of Systems  As noted per HPI     Objective   Blood pressure 124/82, pulse 92, temperature 98.2 °F (36.8 °C), resp. rate 14, height 160 cm (63\"), weight 92.1 kg (203 lb), SpO2 99%. Body mass index is 35.96 kg/m².     Physical Exam  Vitals reviewed.   Constitutional:       Appearance: Normal appearance.   HENT:      Head: Normocephalic.      Right Ear: Tympanic membrane, ear canal and external ear normal.      Left Ear: Tympanic membrane, ear canal and external ear normal.      Nose: Nose normal.      Mouth/Throat:      Pharynx: No oropharyngeal exudate or posterior oropharyngeal erythema.   Eyes:      Conjunctiva/sclera: Conjunctivae normal.   Cardiovascular:      Rate and Rhythm: Normal rate and regular rhythm.   Pulmonary:      Effort: Pulmonary effort is normal.      Breath sounds: Normal breath sounds.   Skin:     General: Skin is warm and dry.   Neurological:      Mental Status: She is alert and oriented to person, place, and time.   Psychiatric:         Mood and Affect: Mood normal.         Behavior: Behavior normal.         Results      Assessment & Plan   Assessment & Plan  1. Viral illness.  Symptoms suggest a viral " etiology. She reports that her symptoms began on Monday, including diarrhea, which has since resolved. She has experienced joint pain, a low-grade fever, and a persistent headache. There are no sinus issues, breathing concerns, or urinary symptoms. Her physical examination reveals that her lungs sound clear, and her throat appears normal. A swab test will be conducted to confirm the diagnosis. A urine test will be performed to rule out a urinary tract infection.    2. Pseudotumor cerebri.  She has a history of pseudotumor cerebri and has a brain shunt in place. She follows up with neurology regularly. She reports stable symptoms with her current medication regimen.    3. Hypercholesterolemia.  She is currently on atorvastatin for cholesterol management. A prescription refill for atorvastatin will be provided.    4. Gastroesophageal reflux disease (GERD).  She is taking pantoprazole 40 mg for acid reflux. A prescription refill for pantoprazole will be provided.    5. Insomnia.  She is on trazodone 100 mg for insomnia and reports stable symptoms. A prescription refill for trazodone will be provided.    PROCEDURE  The patient has a brain shunt due to a pseudotumor cerebri. She has had a complete hysterectomy with ovaries removed due to a benign cyst. Additionally, she has undergone a cholecystectomy and appendectomy.     Diagnoses and all orders for this visit:    1. Body aches (Primary)  -     POCT SARS-CoV-2 Antigen DIGNA + Flu    2. Gastroesophageal reflux disease without esophagitis  -     pantoprazole (PROTONIX) 40 MG EC tablet; Take 1 tablet by mouth Daily.  Dispense: 90 tablet; Refill: 3    3. Insomnia, unspecified type  -     traZODone (DESYREL) 100 MG tablet; Take 1 tablet by mouth every night at bedtime.  Dispense: 90 tablet; Refill: 3    4. Hypercholesterolemia  -     atorvastatin (LIPITOR) 40 MG tablet; Take 1 tablet by mouth every night at bedtime.  Dispense: 90 tablet; Refill: 3               Patient or  patient representative verbalized consent for the use of Ambient Listening during the visit with  XI Collado for chart documentation. 12/24/2024  11:21 EST

## 2025-05-20 ENCOUNTER — OFFICE VISIT (OUTPATIENT)
Dept: FAMILY MEDICINE CLINIC | Facility: CLINIC | Age: 45
End: 2025-05-20
Payer: MEDICARE

## 2025-05-20 VITALS
OXYGEN SATURATION: 100 % | TEMPERATURE: 97.5 F | HEIGHT: 63 IN | WEIGHT: 204 LBS | BODY MASS INDEX: 36.14 KG/M2 | SYSTOLIC BLOOD PRESSURE: 146 MMHG | HEART RATE: 95 BPM | DIASTOLIC BLOOD PRESSURE: 84 MMHG

## 2025-05-20 DIAGNOSIS — K29.90 GASTRODUODENITIS: Primary | ICD-10-CM

## 2025-05-20 DIAGNOSIS — K76.0 HEPATIC STEATOSIS: ICD-10-CM

## 2025-05-20 DIAGNOSIS — K21.9 GASTROESOPHAGEAL REFLUX DISEASE WITHOUT ESOPHAGITIS: ICD-10-CM

## 2025-05-20 PROCEDURE — 99213 OFFICE O/P EST LOW 20 MIN: CPT | Performed by: PHYSICIAN ASSISTANT

## 2025-05-20 RX ORDER — PANTOPRAZOLE SODIUM 40 MG/1
40 TABLET, DELAYED RELEASE ORAL 2 TIMES DAILY
Qty: 60 TABLET | Refills: 5 | Status: SHIPPED | OUTPATIENT
Start: 2025-05-20

## 2025-05-20 RX ORDER — SUCRALFATE ORAL 1 G/10ML
1 SUSPENSION ORAL
Qty: 473 ML | Refills: 0 | Status: SHIPPED | OUTPATIENT
Start: 2025-05-20

## 2025-05-20 RX ORDER — SUCRALFATE 1 G/1
1 TABLET ORAL
COMMUNITY
Start: 2025-05-17 | End: 2025-05-20

## 2025-05-20 RX ORDER — BUTALBITAL, ACETAMINOPHEN AND CAFFEINE 300; 40; 50 MG/1; MG/1; MG/1
CAPSULE ORAL
COMMUNITY
Start: 2025-04-02

## 2025-05-20 RX ORDER — HYDROCODONE BITARTRATE AND ACETAMINOPHEN 7.5; 325 MG/1; MG/1
1 TABLET ORAL
COMMUNITY
Start: 2025-05-17 | End: 2025-05-20

## 2025-05-20 NOTE — PROGRESS NOTES
Subjective   Yu Potts is a 44 y.o. female.     History of Present Illness   History of Present Illness  The patient presents for an ER follow-up from [facility] on 05/17/2025 for gastroduodenitis, as revealed by a CT scan.    She sought emergency care after experiencing a rapid onset of symptoms, including persistent epigastric discomfort and nausea. The pain, described as similar to intense hunger pangs, has been managed with a diet primarily consisting of yogurt. Despite attempts to induce vomiting, she has not experienced any episodes. Her bowel movements have been regular until recently, with the last one occurring on Friday evening. She attributes this change to the administration of Zofran and pain medications. She reports no use of ibuprofen, Motrin, Aleve, or naproxen prior to onset of her symptoms. She also reports no presence of melena prior to her hospital admission. Hx of gallbladder surgery     She has an upcoming appointment with a gastroenterologist on 06/24/2025. She has been prescribed pantoprazole twice daily and sucralfate, which she finds beneficial when taken whole but causes severe illness when crushed. She takes Zofran before bedtime to prevent nocturnal stomach upset and has discontinued pain medication, which was only used on the first night post-hospital discharge due to its ineffectiveness. She recalls that morphine was ineffective during her hospital stay, but Dilaudid provided relief.    She reports no recent travel or exposure to individuals with similar symptoms. Her stress levels are reported to be normal, and she is currently unemployed. She maintains hydration by consuming approximately four cups of water daily and enjoys morning coffee. Her physical activity is limited to walking.    FAMILY HISTORY  Her father had a bleeding ulcer. Ulcers are common in her family.       The following portions of the patient's history were reviewed and updated as appropriate:  "allergies, current medications, past family history, past medical history, past social history, past surgical history, and problem list.    Review of Systems  As noted per HPI     Objective   Blood pressure 146/84, pulse 95, temperature 97.5 °F (36.4 °C), height 160 cm (63\"), weight 92.5 kg (204 lb), SpO2 100%. Body mass index is 36.14 kg/m².     Physical Exam  Vitals and nursing note reviewed.   Constitutional:       Appearance: Normal appearance. She is well-developed.   HENT:      Head: Normocephalic and atraumatic.      Right Ear: External ear normal.      Left Ear: External ear normal.      Nose: Nose normal.   Eyes:      Conjunctiva/sclera: Conjunctivae normal.   Neck:      Thyroid: No thyromegaly.      Trachea: No tracheal deviation.   Cardiovascular:      Rate and Rhythm: Normal rate and regular rhythm.      Heart sounds: Normal heart sounds.   Pulmonary:      Effort: Pulmonary effort is normal. No respiratory distress.      Breath sounds: Normal breath sounds. No wheezing or rales.   Chest:      Chest wall: No tenderness.   Abdominal:      General: Bowel sounds are normal. There is no distension.      Palpations: Abdomen is soft.      Tenderness: There is no abdominal tenderness. There is no guarding.   Musculoskeletal:      Cervical back: Normal range of motion and neck supple.   Lymphadenopathy:      Cervical: No cervical adenopathy.   Skin:     General: Skin is warm and dry.   Neurological:      Mental Status: She is alert and oriented to person, place, and time.   Psychiatric:         Behavior: Behavior normal.         Thought Content: Thought content normal.         Judgment: Judgment normal.         Results  Imaging   - CT scan: 05/17/2025, Gastroduodenitis and constipation. Mild enlargement of the liver and spleen observed. Fatty liver changes noted.    Assessment & Plan   Assessment & Plan  1. Gastroduodenitis.  - Symptoms include persistent discomfort, nausea, and difficulty eating, with the pain " described as similar to extreme hunger.  - CT scan findings from 05/17/2025 confirmed gastroduodenitis. Long-term pantoprazole use may obscure breath test results, necessitating a biopsy for definitive diagnosis. Keep follow up with GI as scheduled.   - Discussion included potential infection or ulcer, possibly H. pylori, and the need for a GI scope scheduled for 06/2025. Counseling on maintaining a bland diet and hydration was provided.  - Continue pantoprazole twice daily and sucralfate before meals. Prescription for sucralfate liquid will be provided. Probiotic regimen recommended. Immediate hospitalization advised if dehydration occurs.    2. Constipation.  - Symptoms include lack of bowel movement since 05/16/2025, likely due to Zofran and pain medications.  - Physical exam findings indicate mild enlargement of the liver and spleen, with fatty liver changes noted.  - Discussion included the need for a stool softener like docusate, and switching to MiraLAX if no improvement.  - Start with stool softener (docusate). If no improvement, switch to MiraLAX.    3. Elevated blood pressure.  - Blood pressure is slightly elevated today, likely due to discomfort.  - Physical exam findings show mild elevation in blood pressure.  - Discussion included monitoring blood pressure closely.  - Blood pressure will be monitored closely.     Diagnoses and all orders for this visit:    1. Gastroduodenitis (Primary)  -     sucralfate (Carafate) 1 GM/10ML suspension; Take 10 mL by mouth 3 (Three) Times a Day With Meals.  Dispense: 473 mL; Refill: 0    2. Hepatic steatosis    3. Gastroesophageal reflux disease without esophagitis  -     pantoprazole (PROTONIX) 40 MG EC tablet; Take 1 tablet by mouth 2 (Two) Times a Day.  Dispense: 60 tablet; Refill: 5               Patient or patient representative verbalized consent for the use of Ambient Listening during the visit with  XI Collado for chart documentation. 5/20/2025  12:44 EDT

## 2025-05-30 ENCOUNTER — PATIENT MESSAGE (OUTPATIENT)
Dept: FAMILY MEDICINE CLINIC | Facility: CLINIC | Age: 45
End: 2025-05-30
Payer: MEDICARE